# Patient Record
Sex: MALE | Race: OTHER
[De-identification: names, ages, dates, MRNs, and addresses within clinical notes are randomized per-mention and may not be internally consistent; named-entity substitution may affect disease eponyms.]

---

## 2020-11-24 NOTE — NUR
Nutrition Assessment Notes



Please refer to link for full assessment notes.



Est Energy needs: 4268-6999 kcals (12-15 kcal/kgBW)

Est Protein needs: 129-161 gms/day (2.0-2.5 gm/kgIBW of 64.5kg)



Will continue to monitor and reassess prn.

-------------------------------------------------------------------------------

Addendum: 11/24/20 at 1145 by Marylou Ceja RD

-------------------------------------------------------------------------------

Amended: Links added.

## 2020-11-24 NOTE — NUR
PT ON  15L NRB AND WAS EDUCATED REGARDING PRONING TO IMPROVE OXYGENATION. PT VERBALIZED 
UNDERSTANDING.

## 2020-11-25 NOTE — NUR
Respiratory note: PATIENT PLACED ON B8 BIPAP PER DR. WEBBER'S TELEPHONE ORDER. HE WAS FITTED 
WITH A MEDIUM FULL FACE MASK AND IS BEING VENTILATED WITH THE FOLLOWING SETTINGS: IPAP 16, 
EPAP 8, BUR 12, 70% FIO2, HE IS TOLERATING WELL. BIPAP PLUGGED INTO RED OUTLET AND ALL 
ALARMS ARE SET AND AUDIBLE,. WILL CONTINUE TO ASSESS PATIENT AS WELL AS BIPAP FUNCTION.

## 2020-11-25 NOTE — NUR
Opening Shift Note

Assumed care of patient, awake and alert.  No S/S of distress/SOB or pain.  Patient only 
speaks Persian.  Instructed on POC and to call for assist PRN, will continue to monitor for 
changes Q1hr and PRN.

## 2020-11-25 NOTE — NUR
PRE REMDESIVIR V/S

97.9 TEMP 113/64 97%  63 HR

15 REMESIVIR V/S 

98.0 TEMP 121/70 B/P 24 RR 65 HR

NO S/S OF DISTRESS NOTED

## 2020-11-25 NOTE — NUR
Telemetry admit from [facility] admitted to Telemetry unit after SBAR received.  Patient oriented to Ben gonzales RN, unit, room 205, bed, and unit policies regarding patient care and visiting 
hours. Patient now on continuous telemetry monitoring, tele box #42 and telemetry reading on 
arrival to unit is SR 72. Patient placed on bedside oxygen 15 L NR, weighed by bedscale and 
encouraged to call if they need something. Patient only speaks Upper sorbian.  All questions and 
concerns addressed, patient verbalized understanding.

## 2020-11-26 NOTE — NUR
Opening Shift Note

Assumed care of patient, awake and alert.  No S/S of distress/SOB or pain noted.  Instructed 
on POC and to call for assist PRN.  Bed is in lowest locked position with bed rails up x2 
and call light is within reach of the patient.

## 2020-11-26 NOTE — NUR
PT REMOVED FROM BIPAP AT THIS TIME AND PLACED ON 12L OXYMIZER. INHALERS ADMINISTERED. SPO2 
94%, HR 58, RR 22. BIPAP ON STANDBY AT BEDSIDE. WILL CONTINUE WITH NEXT SCHEDULED TX.

## 2020-11-26 NOTE — NUR
1705 Pre Remdesivir v/s

98.1 temp 77 hr 22 rr 92% 119/66

1720-15 Remdesivir v/s

98.2 temp 94% 22 rr 135/69 70 hr

No s/s of distress noted

## 2020-11-27 NOTE — NUR
PT TAKEN OF NOC BIPAP AND PLACED ON 12L OXYMIZER WITH SPO2 96%. NO DISTRESS NOTED. WILL 
CONTINUE TO MONITOR PT.

## 2020-11-28 NOTE — NUR
Respiratory note:

PT REMOVED OFF BIPAP AND PLACED ON 12L OXYMIZER, SPO2 98%, 16 RR. NO RESPIRATORY DISTRESS 
NOTED. WILL CONTINUE TO MONITOR PT.

## 2020-11-29 NOTE — NUR
Respiratory note:

PT REMOVED OFF BIPAP AND PLACED ON 8L OXYMIZER, SPO2 98%, 14 RR. NO RESPIRATORY DISTRESS 
NOTED. WILL CONTINUE TO MONITOR PT.

## 2020-11-29 NOTE — NUR
ASSESSMENT NOTE

PT IS ALERT ORIENTED X4, RESTING IN BED COMFORTABLY, NO DISTRESS NOTED, OXYGEN OXYMIZER 8L 
SAT AT 94%, ABLE TO SELF REPOSITION AND VERBALIS HIS NEEDS, PAIN 0/10, AMBULATE AS NEEDED, 
CALL LIGHT WITHIN REACH

## 2020-11-29 NOTE — NUR
DR MOHSIN

AT BED SIDE FOLLOWING UP ON PT, ADVICE TO CHANGE THE OXYMIZER TO NC AND TARGET 93% 
SATURATION

## 2020-11-29 NOTE — NUR
closing note



pt is on 8.5 liters oxymizer.  no respiratory distress noted.  no c/o pain at this time.  
endorsed care to day shift RN Madeline.

## 2020-11-30 NOTE — NUR
Discharge instructions given as ordered. Encourage to follow up with PMD as instructed. All 
questions and concerns addressed. Patient verbalized understanding.  Medication 
reconciliation form completed and copy given to patient. IV removed with catheter intact, 
pressure dressing applied.  Telemetry unit returned to ICU. Patient ambulated to vehicle 
with all personal belongings, accompanied by staff . No distress noted at time of departure.

## 2020-11-30 NOTE — NUR
Respiratory note:

PT REMOVED OFF BIPAP AND PLACED ON 5L NC, SPO2 97%, 14 RR. NO RESPIRATORY DISTRESS NOTED. 
WILL CONTINUE TO MONITOR PT.

## 2020-11-30 NOTE — NUR
closing note.  



pt is on 5 liters nasal cannula.  no respiratory distress noted.  no c/o pain or discomfort. 
 continuous pulse ox #11 in place.  endorsed care to day shift RN.

## 2020-11-30 NOTE — NUR
Opening Shift Note



Assumed care of patient, awake and alert bed is locked and in lowest position bed rails up 
x2 , call light is within reach.  No S/S of distress/SOB or pain.  Instructed on POC and to 
call for assist PRN, will continue to monitor for changes Q1hr and PRN.

## 2020-11-30 NOTE — NUR
Assessment



Patient is a 40-year-old male who is alert and oriented. Prior to admission patient lived 
home with his cousin Edison and functioned independently. Patient can care for his own 
ADL's. Patient does not have any medical equipment at this time. Patient informed me he 
recently moved to [place] a month ago and his new home address is [address] [place] [id number].  Advised patient there is a social service consult for home oxygen. 
Informed patient clinical information will be faxed to [facility]. 

Informed patient he has the right to participate in all discharge planning. Advised patient 
to follow up with primary doctor upon d/c day. Patient verbalized understanding and agreed 
to discharge plan. Per Monalisa with [facility]:  order has been received and portable 
oxygen will be deliver to [facility] at 16:00 and concentrate oxygen to patient home. 
Informed MIKE Franklin. 

-------------------------------------------------------------------------------

Addendum: 11/30/20 at 1434 by ADAN ALEGRE

-------------------------------------------------------------------------------

Amended: Links added.

## 2020-11-30 NOTE — NUR
Nutrition Followup Notes



Wt: 139.0 kg



Pt is positive for COVID in isolation did not  call, currently on CCHO 60g cardiac 
diet with adequate Po of > 7% x 4 per RN doc



Est Energy needs: 7418-7229 kcals (12-15 kcal/kgBW), Est Protein needs: 129-161 gms/day 
(2.0-2.5 gm/kgIBW of 64.5kg). Will continue to monitor and reassess prn.



LABS: No new labs today POC  H



GI: Pt with no record of BM today per RN doc.



BS: 20 low risk.  Refer to wound assessment report for further details.



PES:

1) Obesity r/t energy intake in excess of energy needs aeb BMI of 42.1 kg/m2

2) Altered nutrition related lab values r/t current/chronic medical condition aeb elev RFT, 
hypocalcemia, hypoalbuminemia, elev LFTs



Comments: Will continue to monitor PO status, skin status, pertinent labs and weight trends. 
Will f/u in 3-5 days



Rec: : 1)  Refer pt to a OP RD upon D/C. 2) Continue current plan of care

## 2020-12-07 ENCOUNTER — HOSPITAL ENCOUNTER (EMERGENCY)
Dept: HOSPITAL 15 - ER | Age: 40
LOS: 1 days | Discharge: HOME | End: 2020-12-08
Payer: COMMERCIAL

## 2020-12-07 VITALS — BODY MASS INDEX: 45 KG/M2 | WEIGHT: 280 LBS | HEIGHT: 66 IN

## 2020-12-07 DIAGNOSIS — U07.1: Primary | ICD-10-CM

## 2020-12-07 DIAGNOSIS — J18.9: ICD-10-CM

## 2020-12-07 PROCEDURE — 85025 COMPLETE CBC W/AUTO DIFF WBC: CPT

## 2020-12-07 PROCEDURE — 71045 X-RAY EXAM CHEST 1 VIEW: CPT

## 2020-12-07 PROCEDURE — 80053 COMPREHEN METABOLIC PANEL: CPT

## 2020-12-07 PROCEDURE — 36415 COLL VENOUS BLD VENIPUNCTURE: CPT

## 2020-12-08 VITALS — SYSTOLIC BLOOD PRESSURE: 112 MMHG | DIASTOLIC BLOOD PRESSURE: 77 MMHG

## 2020-12-08 LAB
ALBUMIN SERPL-MCNC: 3.7 G/DL (ref 3.4–5)
ALP SERPL-CCNC: 88 U/L (ref 45–117)
ALT SERPL-CCNC: 103 U/L (ref 16–61)
ANION GAP SERPL CALCULATED.3IONS-SCNC: 4 MMOL/L (ref 5–15)
BILIRUB SERPL-MCNC: 0.4 MG/DL (ref 0.2–1)
BUN SERPL-MCNC: 19 MG/DL (ref 7–18)
BUN/CREAT SERPL: 21.6
CALCIUM SERPL-MCNC: 8.6 MG/DL (ref 8.5–10.1)
CHLORIDE SERPL-SCNC: 103 MMOL/L (ref 98–107)
CO2 SERPL-SCNC: 31 MMOL/L (ref 21–32)
GLUCOSE SERPL-MCNC: 111 MG/DL (ref 74–106)
HCT VFR BLD AUTO: 50 % (ref 41–53)
HGB BLD-MCNC: 16.7 G/DL (ref 13.5–17.5)
MCH RBC QN AUTO: 28.1 PG (ref 28–32)
MCV RBC AUTO: 84.1 FL (ref 80–100)
NRBC BLD QL AUTO: 0.2 %
POTASSIUM SERPL-SCNC: 4.2 MMOL/L (ref 3.5–5.1)
PROT SERPL-MCNC: 8 G/DL (ref 6.4–8.2)
SODIUM SERPL-SCNC: 138 MMOL/L (ref 136–145)